# Patient Record
Sex: MALE | Race: WHITE | NOT HISPANIC OR LATINO
[De-identification: names, ages, dates, MRNs, and addresses within clinical notes are randomized per-mention and may not be internally consistent; named-entity substitution may affect disease eponyms.]

---

## 2022-04-06 PROBLEM — Z00.00 ENCOUNTER FOR PREVENTIVE HEALTH EXAMINATION: Status: ACTIVE | Noted: 2022-04-06

## 2022-04-12 ENCOUNTER — TRANSCRIPTION ENCOUNTER (OUTPATIENT)
Age: 57
End: 2022-04-12

## 2022-04-12 ENCOUNTER — NON-APPOINTMENT (OUTPATIENT)
Age: 57
End: 2022-04-12

## 2022-04-12 ENCOUNTER — APPOINTMENT (OUTPATIENT)
Dept: CARDIOLOGY | Facility: CLINIC | Age: 57
End: 2022-04-12
Payer: COMMERCIAL

## 2022-04-12 VITALS
RESPIRATION RATE: 16 BRPM | BODY MASS INDEX: 42.27 KG/M2 | SYSTOLIC BLOOD PRESSURE: 116 MMHG | DIASTOLIC BLOOD PRESSURE: 78 MMHG | OXYGEN SATURATION: 98 % | HEIGHT: 66 IN | HEART RATE: 76 BPM | WEIGHT: 263 LBS

## 2022-04-12 DIAGNOSIS — Z86.39 PERSONAL HISTORY OF OTHER ENDOCRINE, NUTRITIONAL AND METABOLIC DISEASE: ICD-10-CM

## 2022-04-12 DIAGNOSIS — Z78.9 OTHER SPECIFIED HEALTH STATUS: ICD-10-CM

## 2022-04-12 PROCEDURE — 93000 ELECTROCARDIOGRAM COMPLETE: CPT

## 2022-04-12 PROCEDURE — 99205 OFFICE O/P NEW HI 60 MIN: CPT

## 2022-04-12 RX ORDER — ASPIRIN ENTERIC COATED TABLETS 81 MG 81 MG/1
81 TABLET, DELAYED RELEASE ORAL
Refills: 0 | Status: ACTIVE | COMMUNITY

## 2022-04-12 RX ORDER — ATORVASTATIN CALCIUM 10 MG/1
10 TABLET, FILM COATED ORAL DAILY
Refills: 0 | Status: ACTIVE | COMMUNITY

## 2022-04-12 RX ORDER — INSULIN GLARGINE 100 [IU]/ML
100 INJECTION, SOLUTION SUBCUTANEOUS
Refills: 0 | Status: ACTIVE | COMMUNITY

## 2022-04-12 RX ORDER — METFORMIN HYDROCHLORIDE 1000 MG/1
1000 TABLET, COATED ORAL
Refills: 0 | Status: ACTIVE | COMMUNITY

## 2022-04-12 RX ORDER — METFORMIN HYDROCHLORIDE 500 MG/1
500 TABLET, COATED ORAL
Refills: 0 | Status: ACTIVE | COMMUNITY

## 2022-04-12 NOTE — DISCUSSION/SUMMARY
[FreeTextEntry1] : Patient is a 57yo M with DM, HLD, obesity here for cardiac evaluation.\par -ECG borderline abnormal with borderline IVCD, the low voltage most likely from obesity \par -Lipids and BP controlled, A1C 7.2 and needs better control but has improved. Not using insulin as much\par \par 1. Given risk factors/DM and mildly abnormal ECG will arrange nuclear stress testing and echo\par 2. Recommend aggressive diet and lifestyle modifications \par 3. Diabetes management per PMD. Counselled on diet/weight loss \par 4. Follow up after testing \par

## 2022-04-29 ENCOUNTER — APPOINTMENT (OUTPATIENT)
Dept: CARDIOLOGY | Facility: CLINIC | Age: 57
End: 2022-04-29
Payer: COMMERCIAL

## 2022-04-29 PROCEDURE — 93015 CV STRESS TEST SUPVJ I&R: CPT

## 2022-05-03 ENCOUNTER — APPOINTMENT (OUTPATIENT)
Dept: CARDIOLOGY | Facility: CLINIC | Age: 57
End: 2022-05-03

## 2022-05-04 ENCOUNTER — APPOINTMENT (OUTPATIENT)
Dept: CARDIOLOGY | Facility: CLINIC | Age: 57
End: 2022-05-04
Payer: COMMERCIAL

## 2022-05-04 PROCEDURE — 93306 TTE W/DOPPLER COMPLETE: CPT

## 2022-05-05 ENCOUNTER — APPOINTMENT (OUTPATIENT)
Dept: CARDIOLOGY | Facility: CLINIC | Age: 57
End: 2022-05-05
Payer: COMMERCIAL

## 2022-05-05 VITALS
SYSTOLIC BLOOD PRESSURE: 104 MMHG | RESPIRATION RATE: 16 BRPM | WEIGHT: 249 LBS | DIASTOLIC BLOOD PRESSURE: 69 MMHG | BODY MASS INDEX: 40.02 KG/M2 | HEART RATE: 81 BPM | OXYGEN SATURATION: 97 % | HEIGHT: 66 IN

## 2022-05-05 PROCEDURE — 99214 OFFICE O/P EST MOD 30 MIN: CPT

## 2022-05-05 NOTE — ASSESSMENT
[FreeTextEntry1] : \par \par  H DL 52 LDL 91 TG 96 (2/2022) \par \par \par ECHO 5/2022:\par 1. Normal LV size, systolic and diastolic function. EF 60-65%\par 2. Normal RV/LA/RA \par 3. No clinically significant valvular disease \par \par EST 4/2022:\par 1. Equivocal for ischemia\par 2. Normal HR/BP response\par 3. Achieved 9 minutes and 10 METS without CP

## 2022-05-05 NOTE — DISCUSSION/SUMMARY
[FreeTextEntry1] : Patient is a 57yo M with DM, HLD, obesity here for cardiac evaluation.\par -ECG borderline abnormal with borderline IVCD, the low voltage most likely from obesity \par -Echo unremarkable 5/2022\par -EST 4/2022 equivocal but low risk ischemia\par -Lipids and BP controlled, A1C 7.2 and needs better control but has improved. Stopped insulin recently\par \par \par 1. CV stable at this time, aggressive risk factor modification\par 2. Recommend 30 minutes moderate intensity aerobic activity 5 days per week  \par 3. Diabetes management per PMD. Counselled on diet/weight loss \par 4. Follow up annually \par

## 2022-05-05 NOTE — HISTORY OF PRESENT ILLNESS
[FreeTextEntry1] : Patient is a 55yo M with DM, HLD, obesity here for cardiac follow up. Has been much more active recently, had been over 300 pounds and lost weight. ENjoys boxing as well and ordered [employer] recently. Patient has been doing well without any chest pain or shortness of breath. Patient denies PND/orthopnea/edema/palpitations/syncope/claudication. REmains very active and exercises regularly. Patient underwent cardiac testing after last visit. 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 \par \par ROS: GI and  negative

## 2023-05-09 ENCOUNTER — NON-APPOINTMENT (OUTPATIENT)
Age: 58
End: 2023-05-09

## 2023-05-09 ENCOUNTER — APPOINTMENT (OUTPATIENT)
Dept: CARDIOLOGY | Facility: CLINIC | Age: 58
End: 2023-05-09
Payer: COMMERCIAL

## 2023-05-09 VITALS
SYSTOLIC BLOOD PRESSURE: 96 MMHG | OXYGEN SATURATION: 97 % | RESPIRATION RATE: 16 BRPM | DIASTOLIC BLOOD PRESSURE: 68 MMHG | HEART RATE: 57 BPM | WEIGHT: 187 LBS | HEIGHT: 66 IN | BODY MASS INDEX: 30.05 KG/M2

## 2023-05-09 PROCEDURE — 93000 ELECTROCARDIOGRAM COMPLETE: CPT

## 2023-05-09 PROCEDURE — 99214 OFFICE O/P EST MOD 30 MIN: CPT | Mod: 25

## 2023-05-09 NOTE — ASSESSMENT
[FreeTextEntry1] : ECG: SB, no significant ST-T abnormalities and normal intervals \par \par  H DL 52 LDL 91 TG 96 (2/2022) \par \par \par ECHO 5/2022:\par 1. Normal LV size, systolic and diastolic function. EF 60-65%\par 2. Normal RV/LA/RA \par 3. No clinically significant valvular disease \par \par EST 4/2022:\par 1. Equivocal for ischemia\par 2. Normal HR/BP response\par 3. Achieved 9 minutes and 10 METS without CP

## 2023-05-09 NOTE — DISCUSSION/SUMMARY
[EKG obtained to assist in diagnosis and management of assessed problem(s)] : EKG obtained to assist in diagnosis and management of assessed problem(s) [FreeTextEntry1] : Patient is a 56yo M with DM, HLD, obesity here for cardiac evaluation.\par -ECG borderline abnormal with borderline IVCD, the low voltage most likely from obesity \par -Echo unremarkable 5/2022\par -EST 4/2022 equivocal but low risk ischemia\par -Lipids and BP controlled, BP on lower side but no significant symptoms\par \par \par 1. CV stable at this time, aggressive risk factor modification\par 2. Recommend 30 minutes moderate intensity aerobic activity 5 days per week  \par 3. Diabetes management per PMD. Counselled on diet/weight loss \par 4. Continue statin, BW with PMD\par 5. Annual follow up \par

## 2023-05-09 NOTE — HISTORY OF PRESENT ILLNESS
[FreeTextEntry1] : Patient is a 56yo M with DM, HLD, obesity here for cardiac follow up. . ENjoys boxing as well and ordered [employer] last year. Patient has been doing well without any chest pain or shortness of breath. Patient denies PND/orthopnea/edema/palpitations/syncope/claudication. Walks daily as well and feels well. 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 Has lost significant weight this past year. 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 Lives alone and single now. \par \par ROS: GI and  negative

## 2024-05-07 ENCOUNTER — APPOINTMENT (OUTPATIENT)
Dept: CARDIOLOGY | Facility: CLINIC | Age: 59
End: 2024-05-07
Payer: COMMERCIAL

## 2024-05-07 ENCOUNTER — NON-APPOINTMENT (OUTPATIENT)
Age: 59
End: 2024-05-07

## 2024-05-07 VITALS
BODY MASS INDEX: 31.02 KG/M2 | OXYGEN SATURATION: 97 % | WEIGHT: 193 LBS | RESPIRATION RATE: 16 BRPM | DIASTOLIC BLOOD PRESSURE: 70 MMHG | SYSTOLIC BLOOD PRESSURE: 100 MMHG | HEART RATE: 57 BPM | HEIGHT: 66 IN

## 2024-05-07 DIAGNOSIS — E78.5 HYPERLIPIDEMIA, UNSPECIFIED: ICD-10-CM

## 2024-05-07 DIAGNOSIS — R94.31 ABNORMAL ELECTROCARDIOGRAM [ECG] [EKG]: ICD-10-CM

## 2024-05-07 DIAGNOSIS — E11.9 TYPE 2 DIABETES MELLITUS W/OUT COMPLICATIONS: ICD-10-CM

## 2024-05-07 DIAGNOSIS — E66.9 OBESITY, UNSPECIFIED: ICD-10-CM

## 2024-05-07 PROCEDURE — G2211 COMPLEX E/M VISIT ADD ON: CPT

## 2024-05-07 PROCEDURE — 93000 ELECTROCARDIOGRAM COMPLETE: CPT

## 2024-05-07 PROCEDURE — 99214 OFFICE O/P EST MOD 30 MIN: CPT

## 2024-05-07 NOTE — ASSESSMENT
[FreeTextEntry1] : ECG: SB, no significant ST-T abnormalities and normal intervals    HDL 60 LDL 57 TG 72 (1/2023)   H DL 52 LDL 91 TG 96 (2/2022)    ECHO 5/2022: 1. Normal LV size, systolic and diastolic function. EF 60-65% 2. Normal RV/LA/RA  3. No clinically significant valvular disease   EST 4/2022: 1. Equivocal for ischemia 2. Normal HR/BP response 3. Achieved 9 minutes and 10 METS without CP

## 2024-05-07 NOTE — DISCUSSION/SUMMARY
[EKG obtained to assist in diagnosis and management of assessed problem(s)] : EKG obtained to assist in diagnosis and management of assessed problem(s) [FreeTextEntry1] : Patient is a 57yo M with DM, HLD, obesity here for cardiac follow up   -Echo unremarkable 5/2022 -EST 4/2022 equivocal but low risk ischemia -Lipids at goal from 1/2023  -BP controlled as well, ECG with SB and otherwise unremarkable  -Active and no new symptoms   1. CV stable at this time, aggressive risk factor modification 2. Recommend 30 minutes moderate intensity aerobic activity 5 days per week   3. Diabetes management per PMD. Counselled on diet/weight loss  4. Continue statin, BW to be done at PMD tomorrow . goal LDL < 100  5. annual follow up

## 2024-05-07 NOTE — HISTORY OF PRESENT ILLNESS
[FreeTextEntry1] : Patient is a 59yo M with DM, HLD, obesity here for cardiac follow up. . ENjoys boxing as well but less now, and has [employer]. Patient has been doing well without any chest pain or shortness of breath. Patient denies PND/orthopnea/edema/palpitations/syncope/claudication. Walks daily as well and feels well.    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   ROS: GI and  negative

## 2025-06-25 ENCOUNTER — NON-APPOINTMENT (OUTPATIENT)
Age: 60
End: 2025-06-25

## 2025-06-25 ENCOUNTER — APPOINTMENT (OUTPATIENT)
Dept: CARDIOLOGY | Facility: CLINIC | Age: 60
End: 2025-06-25
Payer: COMMERCIAL

## 2025-06-25 VITALS
HEIGHT: 66 IN | RESPIRATION RATE: 16 BRPM | DIASTOLIC BLOOD PRESSURE: 70 MMHG | BODY MASS INDEX: 30.7 KG/M2 | SYSTOLIC BLOOD PRESSURE: 120 MMHG | HEART RATE: 68 BPM | WEIGHT: 191 LBS

## 2025-06-25 PROCEDURE — 93000 ELECTROCARDIOGRAM COMPLETE: CPT

## 2025-06-25 PROCEDURE — 99214 OFFICE O/P EST MOD 30 MIN: CPT

## 2025-06-25 RX ORDER — TIRZEPATIDE 5 MG/.5ML
5 INJECTION, SOLUTION SUBCUTANEOUS
Qty: 1 | Refills: 0 | Status: ACTIVE | COMMUNITY
Start: 2025-06-25